# Patient Record
Sex: MALE | Race: WHITE
[De-identification: names, ages, dates, MRNs, and addresses within clinical notes are randomized per-mention and may not be internally consistent; named-entity substitution may affect disease eponyms.]

---

## 2017-04-14 ENCOUNTER — HISTORIC RESULTS (OUTPATIENT)
Dept: ADMINISTRATIVE | Age: 52
End: 2017-04-14

## 2017-05-26 ENCOUNTER — HISTORIC RESULTS (OUTPATIENT)
Dept: ADMINISTRATIVE | Age: 52
End: 2017-05-26

## 2017-07-11 ENCOUNTER — TRANSFERRED RECORDS (OUTPATIENT)
Dept: HEALTH INFORMATION MANAGEMENT | Facility: CLINIC | Age: 52
End: 2017-07-11

## 2017-10-12 ENCOUNTER — TRANSFERRED RECORDS (OUTPATIENT)
Dept: HEALTH INFORMATION MANAGEMENT | Facility: CLINIC | Age: 52
End: 2017-10-12

## 2017-10-17 ENCOUNTER — TRANSFERRED RECORDS (OUTPATIENT)
Dept: HEALTH INFORMATION MANAGEMENT | Facility: CLINIC | Age: 52
End: 2017-10-17

## 2017-10-17 DIAGNOSIS — C79.31 METASTASIS TO BRAIN (H): Primary | ICD-10-CM

## 2017-10-30 ENCOUNTER — ALLIED HEALTH/NURSE VISIT (OUTPATIENT)
Dept: RADIATION ONCOLOGY | Facility: CLINIC | Age: 52
End: 2017-10-30

## 2017-10-30 DIAGNOSIS — C79.31 METASTASIS TO BRAIN (H): Primary | ICD-10-CM

## 2017-11-07 ENCOUNTER — TRANSFERRED RECORDS (OUTPATIENT)
Dept: HEALTH INFORMATION MANAGEMENT | Facility: CLINIC | Age: 52
End: 2017-11-07

## 2017-11-07 LAB
ANION GAP SERPL CALCULATED.3IONS-SCNC: NORMAL MMOL/L
BASOPHILS NFR BLD AUTO: ABNORMAL %
BUN SERPL-MCNC: NORMAL MG/DL
CALCIUM SERPL-MCNC: NORMAL MG/DL
CHLORIDE SERPLBLD-SCNC: NORMAL MMOL/L
CO2 SERPL-SCNC: NORMAL MMOL/L
CREAT SERPL-MCNC: 0.86 MG/DL
EOSINOPHIL NFR BLD AUTO: ABNORMAL %
ERYTHROCYTE [DISTWIDTH] IN BLOOD BY AUTOMATED COUNT: ABNORMAL %
GFR SERPL CREATININE-BSD FRML MDRD: >60 ML/MIN/1.73M2
GLUCOSE SERPL-MCNC: NORMAL MG/DL (ref 70–99)
HCT VFR BLD AUTO: ABNORMAL %
HEMOGLOBIN: 13 G/DL (ref 13.3–17.7)
LYMPHOCYTES NFR BLD AUTO: ABNORMAL %
MCH RBC QN AUTO: ABNORMAL PG
MCHC RBC AUTO-ENTMCNC: ABNORMAL G/DL
MCV RBC AUTO: ABNORMAL FL
MONOCYTES NFR BLD AUTO: ABNORMAL %
NEUTROPHILS NFR BLD AUTO: 69.8 %
PLATELET COUNT - QUEST: 342 10^9/L (ref 150–450)
POTASSIUM SERPL-SCNC: 4.2 MMOL/L
RBC # BLD AUTO: ABNORMAL 10^12/L
SODIUM SERPL-SCNC: 138 MMOL/L
WBC # BLD AUTO: 6.6 10^9/L

## 2017-11-08 ENCOUNTER — HOSPITAL ENCOUNTER (OUTPATIENT)
Dept: MRI IMAGING | Facility: CLINIC | Age: 52
Discharge: HOME | End: 2017-11-08
Attending: NEUROLOGICAL SURGERY | Admitting: NEUROLOGICAL SURGERY
Payer: COMMERCIAL

## 2017-11-08 ENCOUNTER — OFFICE VISIT (OUTPATIENT)
Dept: RADIATION ONCOLOGY | Facility: CLINIC | Age: 52
End: 2017-11-08
Attending: RADIOLOGY
Payer: COMMERCIAL

## 2017-11-08 ENCOUNTER — HOSPITAL ENCOUNTER (OUTPATIENT)
Dept: MEDSURG UNIT | Facility: CLINIC | Age: 52
End: 2017-11-08
Attending: NEUROLOGICAL SURGERY
Payer: COMMERCIAL

## 2017-11-08 VITALS
DIASTOLIC BLOOD PRESSURE: 62 MMHG | OXYGEN SATURATION: 96 % | SYSTOLIC BLOOD PRESSURE: 87 MMHG | HEART RATE: 85 BPM | WEIGHT: 195 LBS

## 2017-11-08 VITALS — OXYGEN SATURATION: 94 % | SYSTOLIC BLOOD PRESSURE: 108 MMHG | HEART RATE: 93 BPM | DIASTOLIC BLOOD PRESSURE: 61 MMHG

## 2017-11-08 DIAGNOSIS — C79.31 METASTASIS TO BRAIN (H): ICD-10-CM

## 2017-11-08 DIAGNOSIS — C79.31 METASTASIS TO BRAIN (H): Primary | ICD-10-CM

## 2017-11-08 PROCEDURE — 77370 RADIATION PHYSICS CONSULT: CPT | Performed by: RADIOLOGY

## 2017-11-08 PROCEDURE — 25000128 H RX IP 250 OP 636: Mod: ZF | Performed by: NEUROLOGICAL SURGERY

## 2017-11-08 PROCEDURE — 25000131 ZZH RX MED GY IP 250 OP 636 PS 637: Mod: ZF | Performed by: NEUROLOGICAL SURGERY

## 2017-11-08 PROCEDURE — 77295 3-D RADIOTHERAPY PLAN: CPT | Performed by: RADIOLOGY

## 2017-11-08 PROCEDURE — 25000132 ZZH RX MED GY IP 250 OP 250 PS 637: Mod: ZF | Performed by: NEUROLOGICAL SURGERY

## 2017-11-08 PROCEDURE — 77334 RADIATION TREATMENT AID(S): CPT | Performed by: RADIOLOGY

## 2017-11-08 PROCEDURE — A9585 GADOBUTROL INJECTION: HCPCS | Performed by: NEUROLOGICAL SURGERY

## 2017-11-08 PROCEDURE — 25000125 ZZHC RX 250: Mod: ZF | Performed by: NEUROLOGICAL SURGERY

## 2017-11-08 PROCEDURE — 40000172 ZZH STATISTIC PROCEDURE PREP ONLY

## 2017-11-08 PROCEDURE — 25000128 H RX IP 250 OP 636: Performed by: NEUROLOGICAL SURGERY

## 2017-11-08 PROCEDURE — 70552 MRI BRAIN STEM W/DYE: CPT

## 2017-11-08 PROCEDURE — 77371 SRS MULTISOURCE: CPT | Performed by: RADIOLOGY

## 2017-11-08 PROCEDURE — 77300 RADIATION THERAPY DOSE PLAN: CPT | Performed by: RADIOLOGY

## 2017-11-08 RX ORDER — ATORVASTATIN CALCIUM 40 MG/1
20 TABLET, FILM COATED ORAL
COMMUNITY
Start: 2017-10-02

## 2017-11-08 RX ORDER — LORAZEPAM 0.5 MG/1
.5-2 TABLET ORAL
Status: COMPLETED | OUTPATIENT
Start: 2017-11-08 | End: 2017-11-08

## 2017-11-08 RX ORDER — LORAZEPAM 0.5 MG/1
.5-1 TABLET ORAL
Status: DISCONTINUED | OUTPATIENT
Start: 2017-11-08 | End: 2017-11-08 | Stop reason: HOSPADM

## 2017-11-08 RX ORDER — HYDROCODONE BITARTRATE AND ACETAMINOPHEN 5; 325 MG/1; MG/1
1-2 TABLET ORAL EVERY 6 HOURS PRN
Status: DISCONTINUED | OUTPATIENT
Start: 2017-11-08 | End: 2017-11-08 | Stop reason: HOSPADM

## 2017-11-08 RX ORDER — ONDANSETRON 2 MG/ML
4 INJECTION INTRAMUSCULAR; INTRAVENOUS
Status: DISCONTINUED | OUTPATIENT
Start: 2017-11-08 | End: 2017-11-08 | Stop reason: HOSPADM

## 2017-11-08 RX ORDER — HEPARIN SODIUM (PORCINE) LOCK FLUSH IV SOLN 100 UNIT/ML 100 UNIT/ML
500 SOLUTION INTRAVENOUS SEE ADMIN INSTRUCTIONS
Status: COMPLETED | OUTPATIENT
Start: 2017-11-08 | End: 2017-11-08

## 2017-11-08 RX ORDER — HEPARIN SODIUM,PORCINE 10 UNIT/ML
5 VIAL (ML) INTRAVENOUS
Status: COMPLETED | OUTPATIENT
Start: 2017-11-08 | End: 2017-11-08

## 2017-11-08 RX ORDER — ACETAMINOPHEN 325 MG/1
650 TABLET ORAL EVERY 4 HOURS PRN
Status: DISCONTINUED | OUTPATIENT
Start: 2017-11-08 | End: 2017-11-08 | Stop reason: HOSPADM

## 2017-11-08 RX ORDER — LIDOCAINE 40 MG/G
1 CREAM TOPICAL SEE ADMIN INSTRUCTIONS
Status: COMPLETED | OUTPATIENT
Start: 2017-11-08 | End: 2017-11-08

## 2017-11-08 RX ORDER — GADOBUTROL 604.72 MG/ML
10 INJECTION INTRAVENOUS ONCE
Status: COMPLETED | OUTPATIENT
Start: 2017-11-08 | End: 2017-11-08

## 2017-11-08 RX ORDER — ONDANSETRON 4 MG/1
8 TABLET, ORALLY DISINTEGRATING ORAL EVERY 6 HOURS PRN
Status: DISCONTINUED | OUTPATIENT
Start: 2017-11-08 | End: 2017-11-08 | Stop reason: HOSPADM

## 2017-11-08 RX ORDER — DEXAMETHASONE 4 MG/1
4 TABLET ORAL
Status: COMPLETED | OUTPATIENT
Start: 2017-11-08 | End: 2017-11-08

## 2017-11-08 RX ADMIN — SODIUM CHLORIDE, PRESERVATIVE FREE 5 ML: 5 INJECTION INTRAVENOUS at 05:57

## 2017-11-08 RX ADMIN — SODIUM CHLORIDE, PRESERVATIVE FREE 500 UNITS: 5 INJECTION INTRAVENOUS at 09:28

## 2017-11-08 RX ADMIN — LIDOCAINE HYDROCHLORIDE,EPINEPHRINE BITARTRATE 30 ML: 20; .01 INJECTION, SOLUTION INFILTRATION; PERINEURAL at 05:58

## 2017-11-08 RX ADMIN — SODIUM CHLORIDE, PRESERVATIVE FREE 5 ML: 5 INJECTION INTRAVENOUS at 07:30

## 2017-11-08 RX ADMIN — GADOBUTROL 10 ML: 604.72 INJECTION INTRAVENOUS at 07:36

## 2017-11-08 RX ADMIN — DEXAMETHASONE 4 MG: 4 TABLET ORAL at 09:22

## 2017-11-08 RX ADMIN — LORAZEPAM 2 MG: 0.5 TABLET ORAL at 05:57

## 2017-11-08 RX ADMIN — LIDOCAINE 1 G: 40 CREAM TOPICAL at 05:56

## 2017-11-08 NOTE — LETTER
11/8/2017       RE: Brando Angulo  [address] [facility] [address]     Dear Colleague,    Thank you for referring your patient, Brando Angulo, to the [facility], [place], RADIATION ONCOLOGY. Please see a copy of my visit note below.    PREOPERATIVE DIAGNOSIS: Metastatic non small cell lung cancer with single brain metastasis.    POSTOPERATIVE DIAGNOSIS: Metastatic non small cell lung cancer with single brain metastasis.    OPERATIVE PROCEDURES:   1. Application of stereotactic head frame for stereotactic radiosurgery.   2. Gamma knife radiosurgery to single left cerebellar metastasis.     SURGEON: Luis Mattson MD.     ANESTHESIA: Local.     INDICATION FOR THE PROCEDURE: This patient has metastatic non small cell lung cancer with a single metastasis in the left cerebellum. Gamma knife radiosurgery was recommended.    DESCRIPTION OF PROCEDURE: On the morning of the procedure, the patient was brought to the gamma knife radiosurgery area. A pre-procedure pause was performed to confirm the identity and date of birth of the patient, as well as the procedure site. The scalp was prepped with Betadine and then anesthetized with a combination of Marcaine, lidocaine  and epinephrine. The Le[employer]ell G-frame was applied and the pins were fixed to hand tightness. The patient tolerated the application of the frame well. A depth helmet was placed and pin and post measurements were taken.   The patient was taken to the MRI scanner where an MRI with gadolinium contrast was obtained. The patient returned from MRI and all measurements were checked to make sure that the frame had not moved. The lesion was outlined on the planning software and we made a conformal dose outline for this lesion. Dr. Hancock selected the radiation dose for the lesion.  Measurements were taken,  steps performed and the patient  was then brought into the treatment room. Radiation was given according to the prescription above. The  patient was taken out of the unit and out of the treatment room. The stereotactic frame was removed and a dressing was applied. After observation, the patient was discharged. Followup arrangements will be made for the patient.     I attest that I was present for the entirety of the case, including pre-procedure assessment, stereotactic head frame placement, treatment planning, treatment delivery, as well as frame removal at the end of the treatment.      Luis Mattson MD, PhD  Neurosurgery    GAMMA KNIFE RADIOSURGERY TREATMENT SUMMARY  DEPARTMENT OF RADIATION ONCOLOGY    Name: Brando Angulo                                        : 1965                 Medical Record #: [id number]                       Diagnosis:  Non Small Cell Lung Cancer                                  Date of Treatment: 2017                                       Referring Physicians: Christine Hastings, Gregory Saba, Chance Yan, Puneet Harrison, Tumor Registry     BRIEF CLINICAL HISTORY:                                                                                                 The patient is a 52 year old white male with a 30 pack-year smoking history who was well until May 2016 when he developed cough, shortness of breath and hemoptysis.  Chest CT showed a 6cm left lung mass.  Biopsy confirmed non-small cell lung cancer (adenocarcinoma, EGFR/ALK/PDL1-negative).  Brain MRI 16 showed a 0.2cm mass in the left cerebellum.  He underwent concurrent chemotherapy and thoracic radiation (60Gy completed 10/6/16).  Beteen 2016 and 2017 he received 6 cycles of Alimta and carboplatin and has since been on maintenance Alimta. Brain MRI  17 showed slight increase in the left cerebellar brain metastasis.  PET scan 17 was stable.  In 2017, he developed headaches and nausea.  Brain MRI 10/12/17 showed the brain metastasis had grown to 0.6cm.  He underwent GK radiosurgery as detailed below.  TECHNICAL  SUMMARY        Collimators    Lesion Number Site Energy Minimum Tumor Dose (Gy) Isodose Line (%) Numbers Size (mm) Treatment Volume (cc)   1 Left Sup Cerebellm Cobalt 60 18 50 5 4 0.33     DESCRIPTION OF PROCEDURE:                                                                              On 11/8/2017 the patient was brought to the Gamma Knife suite at [facility].  After sedation and topical anesthetic, the head frame was put on by Dr. Michi Mattson.  The patient was then taken to the department of Radiology where a stereotactic brain MRI was performed.  The patient was admitted to the [facility] where he rested comfortably while treatment planning was completed.  The [employer] Gamma Plan software was used to create a highly conformal dose distribution using the number and size collimators detailed above.  The patient was brought to the Gamma Knife suite.  The treatment was delivered using the Model C Leksell Gamma Knife without complication.  The head frame was removed and the patient was discharged home in stable condition.  FOLLOW-UP PLANS:                                                                                                        The Gamma Knife Nurse Coordinator will call the patient tomorrow for short-term follow up.  He should have a brain MRI in 2 months and every 3 months thereafter.  The patient will also follow-up with Dr Venkata (s).  I would be happy to see him anytime.    Nando Hancock MD, TAWNYA, ANNIE

## 2017-11-08 NOTE — PROGRESS NOTES
Brando arrived on 2a to wait for his gamma knife treatment.  He states he has no pain.  He is resting comfortably.  He is tolerating po.  Family is at bedside.

## 2017-11-08 NOTE — MR AVS SNAPSHOT
After Visit Summary   2017    Brando Angulo    MRN: [id number]           Patient Information     Date Of Birth          1965        Visit Information        Provider Department      2017 5:30 AM Nando Hancock MD [facility], [place], Radiation Oncology        Today's Diagnoses     Metastasis to brain (H)    -  1       Follow-ups after your visit        Future tests that were ordered for you today     Open Standing Orders        Priority Remaining Interval Expires Ordered    Oxygen: Nasal cannula Routine 39883/33671 PRN  2017    If Patient Diabetic: Glucose monitor nursing POCT Routine 23329/30310 PRN 2017            Who to contact     Please call your clinic at [phone] to:    Ask questions about your health    Make or cancel appointments    Discuss your medicines    Learn about your test results    Speak to your doctor   If you have compliments or concerns about an experience at your clinic, or if you wish to file a complaint, please contact [facility] Patient Relations at [phone] or email us at [email or web address]         Additional Information About Your Visit        MyChart Information     [employer] is an electronic gateway that provides easy, online access to your medical records. With AppleTreeBook, you can request a clinic appointment, read your test results, renew a prescription or communicate with your care team.     To sign up for AppleTreeBook visit the website at [email or web address]   You will be asked to enter the access code listed below, as well as some personal information. Please follow the directions to create your username and password.     Your access code is: [email or web address]  Expires: 2018  7:13 AM     Your access code will  in 90 days. If you need help or a new code, please contact your [facility] or call [phone] for assistance.        Care EveryWhere ID      This is your Care EveryWhere ID. This could be used by other organizations to access your [place] medical records  [id number]        Your Vitals Were     Pulse Pulse Oximetry                85 96%           Blood Pressure from Last 3 Encounters:   11/08/17 108/61   11/08/17 (!) 87/62    Weight from Last 3 Encounters:   11/08/17 88.5 kg (195 lb)              Today, you had the following     No orders found for display       Primary Care Provider    Physician No Ref-Primary       NO REF-PRIMARY PHYSICIAN        Equal Access to Services     DOUGLAS RODRIGUEZ : Hadii aad ku hadasho Soomaali, waaxda luqadaha, qaybta kaalmada adeegyada, waxay idiin hayaan adeeg ankur de la cruz . So [place] -[phone].    ATENCIÓN: Si habla español, tiene a lanier disposición servicios gratuitos de asistencia lingüística. Llame al [phone].    We comply with applicable federal civil rights laws and [place] laws. We do not discriminate on the basis of race, color, national origin, age, disability, sex, sexual orientation, or gender identity.            Thank you!     Thank you for choosing [facility], RADIATION ONCOLOGY  for your care. Our goal is always to provide you with excellent care. Hearing back from our patients is one way we can continue to improve our services. Please take a few minutes to complete the written survey that you may receive in the mail after your visit with us. Thank you!             Your Updated Medication List - Protect others around you: Learn how to safely use, store and throw away your medicines at www.disposemymeds.org.          This list is accurate as of: 11/8/17  2:20 PM.  Always use your most recent med list.                   Brand Name Dispense Instructions for use Diagnosis    atorvastatin 40 MG tablet    LIPITOR     Take 20 mg by mouth        FOLIC ACID PO      Take by mouth daily        metoprolol 10 mg/mL Susp    LOPRESSOR     Take 25 mg by mouth once        RAMIPRIL PO      Take 1.25 mg by  mouth daily

## 2017-11-08 NOTE — PROGRESS NOTES
GAMMA KNIFE RADIOSURGERY TREATMENT SUMMARY  DEPARTMENT OF RADIATION ONCOLOGY    Name: Brando Angulo                                        : 1965                 Medical Record #: [id number]                       Diagnosis:  Non Small Cell Lung Cancer                                  Date of Treatment: 2017                                       Referring Physicians: Christine Hastings, Gregory Saba, Chance Yan, Puneet Harrison, Tumor Registry     BRIEF CLINICAL HISTORY:                                                                                                 The patient is a 52 year old white male with a 30 pack-year smoking history who was well until May 2016 when he developed cough, shortness of breath and hemoptysis.  Chest CT showed a 6cm left lung mass.  Biopsy confirmed non-small cell lung cancer (adenocarcinoma, EGFR/ALK/PDL1-negative).  Brain MRI 16 showed a 0.2cm mass in the left cerebellum.  He underwent concurrent chemotherapy and thoracic radiation (60Gy completed 10/6/16).  Beteen 2016 and 2017 he received 6 cycles of Alimta and carboplatin and has since been on maintenance Alimta. Brain MRI  17 showed slight increase in the left cerebellar brain metastasis.  PET scan 17 was stable.  In 2017, he developed headaches and nausea.  Brain MRI 10/12/17 showed the brain metastasis had grown to 0.6cm.  He underwent GK radiosurgery as detailed below.  TECHNICAL SUMMARY        Collimators    Lesion Number Site Energy Minimum Tumor Dose (Gy) Isodose Line (%) Numbers Size (mm) Treatment Volume (cc)   1 Left Sup Cerebellm Cobalt 60 18 50 5 4 0.33     DESCRIPTION OF PROCEDURE:                                                                              On 2017 the patient was brought to the Gamma Knife suite at [facility].  After sedation and topical anesthetic, the head frame was put on by Dr. Michi Mattson.  The patient was then  taken to the department of Radiology where a stereotactic brain MRI was performed.  The patient was admitted to the [facility] where he rested comfortably while treatment planning was completed.  The Le[employer]ell Gamma Plan software was used to create a highly conformal dose distribution using the number and size collimators detailed above.  The patient was brought to the Gamma Knife suite.  The treatment was delivered using the Model C Leksell Gamma Knife without complication.  The head frame was removed and the patient was discharged home in stable condition.  FOLLOW-UP PLANS:                                                                                                        The Gamma Knife Nurse Coordinator will call the patient tomorrow for short-term follow up.  He should have a brain MRI in 2 months and every 3 months thereafter.  The patient will also follow-up with Dr Venkata (s).  I would be happy to see him anytime.    Nando Hancock MD, [facility], ANNIE

## 2017-11-08 NOTE — PROGRESS NOTES
PREOPERATIVE DIAGNOSIS: Metastatic non small cell lung cancer with single brain metastasis.    POSTOPERATIVE DIAGNOSIS: Metastatic non small cell lung cancer with single brain metastasis.    OPERATIVE PROCEDURES:   1. Application of stereotactic head frame for stereotactic radiosurgery.   2. Gamma knife radiosurgery to single left cerebellar metastasis.     SURGEON: Luis Mattson MD.     ANESTHESIA: Local.     INDICATION FOR THE PROCEDURE: This patient has metastatic non small cell lung cancer with a single metastasis in the left cerebellum. Gamma knife radiosurgery was recommended.    DESCRIPTION OF PROCEDURE: On the morning of the procedure, the patient was brought to the gamma knife radiosurgery area. A pre-procedure pause was performed to confirm the identity and date of birth of the patient, as well as the procedure site. The scalp was prepped with Betadine and then anesthetized with a combination of Marcaine, lidocaine  and epinephrine. The [employer] G-frame was applied and the pins were fixed to hand tightness. The patient tolerated the application of the frame well. A depth helmet was placed and pin and post measurements were taken.   The patient was taken to the MRI scanner where an MRI with gadolinium contrast was obtained. The patient returned from MRI and all measurements were checked to make sure that the frame had not moved. The lesion was outlined on the planning software and we made a conformal dose outline for this lesion. Dr. Hancock selected the radiation dose for the lesion.  Measurements were taken,  steps performed and the patient  was then brought into the treatment room. Radiation was given according to the prescription above. The patient was taken out of the unit and out of the treatment room. The stereotactic frame was removed and a dressing was applied. After observation, the patient was discharged. Followup arrangements will be made for the patient.     I attest that I  was present for the entirety of the case, including pre-procedure assessment, stereotactic head frame placement, treatment planning, treatment delivery, as well as frame removal at the end of the treatment.      Luis Mattson MD, PhD  Neurosurgery

## 2017-11-09 ENCOUNTER — TELEPHONE (OUTPATIENT)
Dept: RADIATION ONCOLOGY | Facility: CLINIC | Age: 52
End: 2017-11-09

## 2017-11-09 NOTE — TELEPHONE ENCOUNTER
A call was placed to Brando in follow up to his Gamma Knife treatment on 11/8/17.  Brando said he thought he was doing OK.  Brando denied a headache, said one of the front pin sites is still a bit swollen and oozing a little bit of clear drainage, I suggested cool compresses.  Brando had no complaints

## 2018-03-01 ENCOUNTER — DOCUMENTATION ONLY (OUTPATIENT)
Dept: FAMILY MEDICINE | Facility: OTHER | Age: 53
End: 2018-03-01

## (undated) RX ORDER — LIDOCAINE 40 MG/G
CREAM TOPICAL
Status: DISPENSED
Start: 2017-11-08

## (undated) RX ORDER — LORAZEPAM 0.5 MG/1
TABLET ORAL
Status: DISPENSED
Start: 2017-11-08

## (undated) RX ORDER — HEPARIN SODIUM (PORCINE) LOCK FLUSH IV SOLN 100 UNIT/ML 100 UNIT/ML
SOLUTION INTRAVENOUS
Status: DISPENSED
Start: 2017-11-08

## (undated) RX ORDER — DEXAMETHASONE 4 MG/1
TABLET ORAL
Status: DISPENSED
Start: 2017-11-08

## (undated) RX ORDER — HEPARIN SODIUM,PORCINE 10 UNIT/ML
VIAL (ML) INTRAVENOUS
Status: DISPENSED
Start: 2017-11-08